# Patient Record
Sex: MALE
[De-identification: names, ages, dates, MRNs, and addresses within clinical notes are randomized per-mention and may not be internally consistent; named-entity substitution may affect disease eponyms.]

---

## 2019-11-27 ENCOUNTER — HOSPITAL ENCOUNTER (EMERGENCY)
Dept: HOSPITAL 39 - ER | Age: 24
Discharge: HOME | End: 2019-11-27
Payer: COMMERCIAL

## 2019-11-27 VITALS — OXYGEN SATURATION: 97 % | TEMPERATURE: 99.5 F

## 2019-11-27 VITALS — DIASTOLIC BLOOD PRESSURE: 89 MMHG | SYSTOLIC BLOOD PRESSURE: 150 MMHG
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
## 2019-11-27 DIAGNOSIS — Z87.891: ICD-10-CM

## 2019-11-27 DIAGNOSIS — S39.012A: Primary | ICD-10-CM
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
## 2019-11-27 PROCEDURE — 72100 X-RAY EXAM L-S SPINE 2/3 VWS: CPT

## 2019-11-27 NOTE — RAD
EXAM DESCRIPTION:

Lumbar Spine 3 Views



CLINICAL HISTORY:

24 years Male, lbp



COMPARISON:None.



FINDINGS:

No fracture. 

Trace retrolisthesis of L3 on L4.

Disc spaces are preserved.

Soft tissues are unremarkable.



IMPRESSION:



No acute cervical spine abnormality.



Electronically signed by:  Zac Paredes DO  11/27/2019 7:41

PM [facility] Workstation: [id number]

## 2019-11-27 NOTE — ED.PDOC
History of Present Illness





- General


Chief Complaint: Back Pain or Injury


Stated Complaint: low back pain


Time Seen by Provider: 11/27/19 18:54


Source: patient, RN notes reviewed, Vital Signs reviewed


Exam Limitations: no limitations


Additional Information: 





this is a 24-year-old gentleman who presents with complaints of low back pain.  

It occurred just prior to arrival.  He was playing with his young niece and 

swinging her while in a bent over position.  He states that he felt a pop and 

began to have pain.  Pain radiates to the left buttocks region.  He has not had 

low back in the past.  He has not taken any medications for this before arrival.





- History of Present Illness


Allergies/Adverse Reactions: 


Allergies





NO KNOWN ALLERGY Allergy (Verified 07/01/15 22:22)


   





Home Medications: 


Ambulatory Orders





Acetaminophen W/ Codeine [Acetaminophen/Codeine 300-30 mg] 1 tab PO Q6-8H PRN 

#15 tab 07/01/15 


Clindamycin HCl 300 mg PO Q6HR #40 cap 07/01/15 


Cyclobenzaprine HCl [Flexeril] 10 mg PO Q8HRS #15 tab 11/27/19 


Naproxen [Naprosyn] 250 mg PO BID #20 tab 11/27/19 











Review of Systems





- Review of Systems


Constitutional: States: no symptoms reported


Respiratory: States: no symptoms reported


Cardiology: States: no symptoms reported


Gastrointestinal/Abdominal: States: no symptoms reported


Musculoskeletal: States: back pain - low back, midline, radiating to the left 

side, muscle pain


Skin: States: no symptoms reported


Neurological: States: no symptoms reported.  Denies: numbness, paresthesia, 

weakness





Past Medical History (General)





- Patient Medical History


Hx Asthma: No


Hx Thyroid Disease: No


Hx Diabetes: No


Hx Renal Disease: No


Hx Cancer: No


Hx Hepatitis C: No


Surgical History: no surgical history





- Vaccination History


Hx Tetanus, Diphtheria Vaccination: No


Hx Influenza Vaccination: No


Hx Pneumococcal Vaccination: No





- Social History


Hx Tobacco Use: Yes


Hx Alcohol Use: Yes


Hx Substance Use: No


Hx Substance Use Treatment: No


Hx Depression: No





- Female History


Patient Pregnant: No





Family Medical History





- Family History


  ** Father


Family History: Unknown





Physical Exam





- Physical Exam


General Appearance: Alert, No apparent distress, Other - does not appear to be 

in distress.  Speaks normally, able to rise from a sitting position very easily.

  Able to extend his back and straighten without difficulty.


Eyes, Ears, Nose, Throat Exam: PERRL/EOMI


Back Exam: normal inspection, no CVA tenderness, muscle spasm, vertebral 

tenderness - L3-L5 region, noted muscular tenderness in the left paraspinal 

musculature with radiation to the left buttock, other - restricted in forward 

flexion and side bending


Neurologic: CNs II-XII nml as tested, no motor/sensory deficits, alert, normal 

mood/affect, oriented x 3


Skin Exam: normal color, warm/dry





Progress





- Progress


Progress: 





11/27/19 19:03


MDM: Patient will have x-ray evaluation performed.  We will treat with Toradol 

currently.  Highly likely we are dealing more with muscular spasm rather than 

congenital abnormality or acquired abnormality in the spine


11/27/19 19:48


patient is feeling better.  I discussed this x-ray evaluation with him.  Gave 

him particular instructions on how to care for himself over the next several 

days.  We will prepare for discharge.





- Results/Orders


Results/Orders: 





IMPRESSION:  No acute cervical spine abnormality.  Electronically signed by: 

Zac Paredes DO 11/27/2019 7:41 PM CST Workstation: [id number]





Departure





- Departure


Clinical Impression: 


Lumbar strain


Qualifiers:


 Encounter type: initial encounter Qualified Code(s): S39.012A - Strain of 

muscle, fascia and tendon of lower back, initial encounter





Time of Disposition: 19:49


Disposition: Discharge to Home or Self Care


Condition: Good


Departure Forms:  ED Discharge - Pt. Copy, Patient Portal Self Enrollment


Instructions:  DI for Low Back Pain


Referrals: 


DANNY RIVERA MD REF [Referring] - 1-2 Weeks


Prescriptions: 


Cyclobenzaprine HCl [Flexeril] 10 mg PO Q8HRS #15 tab


Naproxen [Naprosyn] 250 mg PO BID #20 tab


Home Medications: 


Ambulatory Orders





Acetaminophen W/ Codeine [Acetaminophen/Codeine 300-30 mg] 1 tab PO Q6-8H PRN 

#15 tab 07/01/15 


Clindamycin HCl 300 mg PO Q6HR #40 cap 07/01/15 


Cyclobenzaprine HCl [Flexeril] 10 mg PO Q8HRS #15 tab 11/27/19 


Naproxen [Naprosyn] 250 mg PO BID #20 tab 11/27/19 








Additional Instructions: 


take medications as prescribed.  No strenuous activity over the course of the 

next week.  Patient does not return to work until 2 weeks.  Ice may be used for 

the next 48 hours intermittently.  Follow-up with PCP as necessary.  Return to 

the emergency department if needed